# Patient Record
Sex: FEMALE | Race: ASIAN | NOT HISPANIC OR LATINO
[De-identification: names, ages, dates, MRNs, and addresses within clinical notes are randomized per-mention and may not be internally consistent; named-entity substitution may affect disease eponyms.]

---

## 2022-11-29 ENCOUNTER — APPOINTMENT (OUTPATIENT)
Dept: CARDIOLOGY | Facility: CLINIC | Age: 67
End: 2022-11-29

## 2022-11-29 VITALS
HEIGHT: 62.6 IN | BODY MASS INDEX: 25.3 KG/M2 | RESPIRATION RATE: 18 BRPM | OXYGEN SATURATION: 97 % | HEART RATE: 66 BPM | WEIGHT: 141 LBS | SYSTOLIC BLOOD PRESSURE: 144 MMHG | DIASTOLIC BLOOD PRESSURE: 83 MMHG | TEMPERATURE: 97.6 F

## 2022-11-29 DIAGNOSIS — I10 ESSENTIAL (PRIMARY) HYPERTENSION: ICD-10-CM

## 2022-11-29 DIAGNOSIS — Z86.39 PERSONAL HISTORY OF OTHER ENDOCRINE, NUTRITIONAL AND METABOLIC DISEASE: ICD-10-CM

## 2022-11-29 PROBLEM — Z00.00 ENCOUNTER FOR PREVENTIVE HEALTH EXAMINATION: Status: ACTIVE | Noted: 2022-11-29

## 2022-11-29 PROCEDURE — 99203 OFFICE O/P NEW LOW 30 MIN: CPT

## 2022-12-05 ENCOUNTER — NON-APPOINTMENT (OUTPATIENT)
Age: 67
End: 2022-12-05

## 2022-12-05 RX ORDER — METOPROLOL SUCCINATE 50 MG/1
50 TABLET, EXTENDED RELEASE ORAL DAILY
Qty: 30 | Refills: 2 | Status: ACTIVE | COMMUNITY
Start: 2022-12-05 | End: 1900-01-01

## 2022-12-05 NOTE — HISTORY OF PRESENT ILLNESS
[FreeTextEntry1] : 67 year old female with history of T2DM, HTN, HLD, GERD who was referred for evaluation of palpitations.\par \par Pt states that yesterday she had a scan for osteoporosis and after going home, she started to have palpitations all night. They feel strong, regular and fast to her. Denies any chest pain, SOB, dizziness, lightheadedness, or LOC. She reports good exercise tolerance. This has never happened before. She doesn't really drink coffee or tea. \par \par Labs 10/26/22: a1c 6.6, LDL 78, HDL 48, Trig 133, Cr 0.62\par \par She takes amlodipine 2.5, losartan 100, metoprolol 25mg daily. She is on metformin 500 daily for DM and simvastatin 20mg daily.

## 2022-12-05 NOTE — ASSESSMENT
[FreeTextEntry1] : 67 year old female with history of T2DM, HTN, HLD, GERD who was referred for evaluation of palpitations.\par \par Pt states that yesterday she had a scan for osteoporosis and after going home, she started to have palpitations all night. They feel strong, regular and fast to her. Denies any chest pain, SOB, dizziness, lightheadedness, or LOC. She reports good exercise tolerance. This has never happened before. She doesn't really drink coffee or tea. \par \par Labs 10/26/22: a1c 6.6, LDL 78, HDL 48, Trig 133, Cr 0.62\par \par She takes amlodipine 2.5, losartan 100, metoprolol 25mg daily. She is on metformin 500 daily for DM and simvastatin 20mg daily.\par \par 1) palpitations,\par - she appears euvolemic on exam and has no chest pain, shortness of breath or dizziness\par - I advised 24 hour Holter monitor to assess for arrhythmia -- showed overall NSR (, avg 75 bpm), rare VPCs, 2.8% moderate APCs, and 7 runs of pAT (longest lasting 14 beats, and fastest 6 beats at 184 bpm). I advised pt to increase her metoprolol to 50mg daily. Given runs of pAT, moderate PACs, I advised pt to return for TTE to assess LA size and r/o structural heart disease.\par \par 2) HTN\par - continue current medications\par \par 3) Follow-up, pending TTE

## 2022-12-30 ENCOUNTER — APPOINTMENT (OUTPATIENT)
Dept: CARDIOLOGY | Facility: CLINIC | Age: 67
End: 2022-12-30
Payer: MEDICAID

## 2022-12-30 VITALS
SYSTOLIC BLOOD PRESSURE: 150 MMHG | RESPIRATION RATE: 18 BRPM | DIASTOLIC BLOOD PRESSURE: 78 MMHG | TEMPERATURE: 97.5 F | HEART RATE: 61 BPM | OXYGEN SATURATION: 100 %

## 2022-12-30 PROCEDURE — 93306 TTE W/DOPPLER COMPLETE: CPT

## 2023-03-09 ENCOUNTER — APPOINTMENT (OUTPATIENT)
Dept: CARDIOLOGY | Facility: CLINIC | Age: 68
End: 2023-03-09
Payer: MEDICAID

## 2023-03-09 VITALS
OXYGEN SATURATION: 97 % | HEART RATE: 68 BPM | SYSTOLIC BLOOD PRESSURE: 165 MMHG | TEMPERATURE: 98 F | RESPIRATION RATE: 18 BRPM | DIASTOLIC BLOOD PRESSURE: 85 MMHG

## 2023-03-09 DIAGNOSIS — R00.2 PALPITATIONS: ICD-10-CM

## 2023-03-09 DIAGNOSIS — I47.1 SUPRAVENTRICULAR TACHYCARDIA: ICD-10-CM
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
## 2023-03-09 PROCEDURE — 99214 OFFICE O/P EST MOD 30 MIN: CPT

## 2023-03-27 ENCOUNTER — APPOINTMENT (OUTPATIENT)
Dept: CARDIOLOGY | Facility: CLINIC | Age: 68
End: 2023-03-27
Payer: MEDICAID

## 2023-03-27 DIAGNOSIS — R42 DIZZINESS AND GIDDINESS: ICD-10-CM

## 2023-03-27 PROCEDURE — 93880 EXTRACRANIAL BILAT STUDY: CPT

## 2023-03-29 PROBLEM — R42 LIGHTHEADEDNESS: Status: ACTIVE | Noted: 2023-03-09

## 2023-04-12 NOTE — HISTORY OF PRESENT ILLNESS
[FreeTextEntry1] : 67 year old female with history of T2DM, HTN, HLD, GERD, PACs/pAT who was referred for possible syncope.\par \par Seen by PCP Dr. Carmichael 3/4 for mechanical fall and seen again 3/9/23 for another fall, unclear if syncope and did not hit head. Pt states that she did not lose consciousness. She has been trying to run around the park for exercise given elevated A1C. She was able to run 30-40 minutes without symptoms. The first time, she said she twisted her ankle and the second time, she said tripped over something on the ground. She was aware of everything that happened. She states her palpitations have improved with increased metoprolol dosing. She is awaiting pulmonary evaluation 4/3/23.\par \par Last seen 11/29/22: Pt states that yesterday she had a scan for osteoporosis and after going home, she started to have palpitations all night. They feel strong, regular and fast to her. Denies any chest pain, SOB, dizziness, lightheadedness, or LOC. She reports good exercise tolerance. This has never happened before. She doesn't really drink coffee or tea. She underwent Holter which showed overall NSR (, avg 75 bpm), rare VPCs, 2.8% moderate APCs, and 7 runs of pAT (longest lasting 14 beats, and fastest 6 beats at 184 bpm). She was advised to increase Toprol to 50mg daily. She underwent TTE which showed normal LVEF 65-70% but at least moderate TR with borderline pulm hypertension (PASp 38mmHg) without evidence of right heart dilation/dysfunction.\par \par She takes amlodipine 2.5, losartan 100, metoprolol 50mg daily. She is on metformin 500 daily for DM and simvastatin 20mg daily.

## 2023-04-12 NOTE — ASSESSMENT
[FreeTextEntry1] : 67 year old female with history of T2DM, HTN, HLD, GERD, PACs/pAT who was referred for possible syncope.\par \par Seen by PCP Dr. Carmichael 3/4 for mechanical fall and seen again 3/9/23 for another fall, unclear if syncope and did not hit head. Pt states that she did not lose consciousness. She has been trying to run around the park for exercise given elevated A1C. She was able to run 30-40 minutes without symptoms. The first time, she said she twisted her ankle and the second time, she said tripped over something on the ground. She was aware of everything that happened. She states her palpitations have improved with increased metoprolol dosing. She is awaiting pulmonary evaluation 4/3/23.\par \par She takes amlodipine 2.5, losartan 100, metoprolol 50mg daily. She is on metformin 500 daily for DM and simvastatin 20mg daily. \par \par 1) Lightheadedness, likely mechanical fall\par - ECG shows NSR with occasional PACs on 3/4/23. Last Holter 11/29/22 showed overall NSR (, avg 75 bpm), rare VPCs, 2.8% moderate APCs, and 7 runs of pAT (longest lasting 14 beats, and fastest 6 beats at 184 bpm). No evidence of significant bradyarrhythmia or heart block.\par - TTE 12/30/22 shows normal LVEF 65-70% but at least moderate TR with borderline pulm hypertension (PASp 38mmHg) without evidence of right heart dilation/dysfunction\par - I advised pt to undergo carotid duplex to r/o carotid disease - completed 3/27/23 which showed mild L ICA stenosis 20-49%, which is not a cause for her symptoms. If her LDL is high, would recommend uptitration of statin\par - Pt to see pulmonary Dr. Vivar 4/3/23\par \par 2) Follow-up, annually\par

## 2024-08-19 ENCOUNTER — NON-APPOINTMENT (OUTPATIENT)
Age: 69
End: 2024-08-19

## 2024-08-20 ENCOUNTER — APPOINTMENT (OUTPATIENT)
Dept: CARDIOLOGY | Facility: CLINIC | Age: 69
End: 2024-08-20
Payer: MEDICARE

## 2024-08-20 VITALS
OXYGEN SATURATION: 99 % | WEIGHT: 137 LBS | DIASTOLIC BLOOD PRESSURE: 79 MMHG | HEART RATE: 67 BPM | SYSTOLIC BLOOD PRESSURE: 134 MMHG | TEMPERATURE: 97.4 F | RESPIRATION RATE: 18 BRPM | BODY MASS INDEX: 24.58 KG/M2

## 2024-08-20 DIAGNOSIS — R07.89 OTHER CHEST PAIN: ICD-10-CM

## 2024-08-20 DIAGNOSIS — R06.00 DYSPNEA, UNSPECIFIED: ICD-10-CM

## 2024-08-20 DIAGNOSIS — Z01.810 ENCOUNTER FOR PREPROCEDURAL CARDIOVASCULAR EXAMINATION: ICD-10-CM

## 2024-08-20 PROCEDURE — 93325 DOPPLER ECHO COLOR FLOW MAPG: CPT

## 2024-08-20 PROCEDURE — 99204 OFFICE O/P NEW MOD 45 MIN: CPT | Mod: 25

## 2024-08-20 PROCEDURE — 93320 DOPPLER ECHO COMPLETE: CPT

## 2024-08-20 PROCEDURE — 93351 STRESS TTE COMPLETE: CPT

## 2024-08-20 NOTE — HISTORY OF PRESENT ILLNESS
[FreeTextEntry1] : She is here for pre-op clearance for cataract surgery. She reports 3-4 days of mild intermittent chest discomfort and SOB that is not related to exertion. She also reports increased dry cough. She is pending CXR as ordered by PCP. No palpitation, dizziness, LOC, orthopnea, PND, or LE edema.  Seen by PCP Dr. Carmichael 3/4/23 for mechanical fall and seen again 3/9/23 for another fall, unclear if syncope and did not hit head. Pt states that she did not lose consciousness. She has been trying to run around the park for exercise given elevated A1C. She was able to run 30-40 minutes without symptoms. The first time, she said she twisted her ankle and the second time, she said tripped over something on the ground. She was aware of everything that happened. She states her palpitations have improved with increased metoprolol dosing. She is awaiting pulmonary evaluation 4/3/23.  Last seen 11/29/22: Pt states that yesterday she had a scan for osteoporosis and after going home, she started to have palpitations all night. They feel strong, regular and fast to her. Denies any chest pain, SOB, dizziness, lightheadedness, or LOC. She reports good exercise tolerance. This has never happened before. She doesn't really drink coffee or tea. She underwent Holter which showed overall NSR (, avg 75 bpm), rare VPCs, 2.8% moderate APCs, and 7 runs of pAT (longest lasting 14 beats, and fastest 6 beats at 184 bpm). She was advised to increase Toprol to 50mg daily. She underwent TTE which showed normal LVEF 65-70% but at least moderate TR with borderline pulm hypertension (PASp 38mmHg) without evidence of right heart dilation/dysfunction.

## 2024-08-20 NOTE — REASON FOR VISIT
[FreeTextEntry1] : 68 year old female with history of T2DM, HTN, HLD, GERD, PACs/pAT, ?chronic bronchitis who presents for f/u.  She takes amlodipine 2.5, losartan 100, metoprolol 50mg daily. She is on metformin 500 daily for DM and simvastatin 20mg daily.

## 2024-08-20 NOTE — ASSESSMENT
[FreeTextEntry1] : 68 year old female with history of T2DM, HTN, HLD, GERD, PACs/pAT, ?chronic bronchitis who presents for f/u.  1) Pre-op cardiac clearance for cataract surgery 2) Chest discomfort, atypical 3) Dyspnea, not related to exertion - EKG 8/20/24 at Dr. Garduno's showed sinus rhythm with non-specific T wave abnormalities - I advised pt to undergo treadmill stress echo 8/20/24; pt did 7 min Jaime protocol, was asymptomatic and had normal augmentation of LV systolic function without echo evidence of ischemia. Baseline TTE showed normal LVEF 65%, grade 1 DD, mild concentric LVH, normal RV size/function, moderate TR and mild pHTN (PASP 40 mmHg). - The TR and pHTN are similar to previous study. The pHTN may be related to long-standing HTN and left heart disease. However, I advised pt to see pulmonary to work up other causes of pHTN. This is not a contraindication to proceeding to cataract surgery. - Her RCRI score is 0, which puts her at low cardiac risk for planned cataract surgery. - There is no evidence of active ACS, arrhythmia, clinical heart failure or significant valvular heart disease. She is medically optimized from cardiac standpoint and may proceed to planned cataract surgery without further cardiac testing.  4) Follow-up, annually or sooner if symptomatic